# Patient Record
Sex: FEMALE | Race: AMERICAN INDIAN OR ALASKA NATIVE | NOT HISPANIC OR LATINO | URBAN - METROPOLITAN AREA
[De-identification: names, ages, dates, MRNs, and addresses within clinical notes are randomized per-mention and may not be internally consistent; named-entity substitution may affect disease eponyms.]

---

## 2021-08-06 ENCOUNTER — HISTORICAL (OUTPATIENT)
Dept: ADMINISTRATIVE | Facility: HOSPITAL | Age: 31
End: 2021-08-06

## 2021-08-06 LAB
GLUCOSE 1H P 100 G GLC PO SERPL-MCNC: 161 MG/DL (ref 100–180)
GLUCOSE 2H P 100 G GLC PO SERPL-MCNC: 138 MG/DL (ref 70–140)
GLUCOSE 3H P 100 G GLC PO SERPL-MCNC: 94 MG/DL (ref 70–115)
GLUCOSE BS SERPL-MCNC: 85 MG/DL (ref 70–115)

## 2021-11-12 ENCOUNTER — HISTORICAL (OUTPATIENT)
Dept: ADMINISTRATIVE | Facility: HOSPITAL | Age: 31
End: 2021-11-12

## 2021-11-12 LAB — SARS-COV-2 RNA RESP QL NAA+PROBE: NOT DETECTED

## 2023-01-27 LAB
CHOLEST SERPL-MSCNC: 130 MG/DL (ref 0–200)
HDLC SERPL-MCNC: 54 MG/DL (ref 35–70)
LDLC SERPL CALC-MCNC: 58 MG/DL (ref 0–160)
TRIGL SERPL-MCNC: 97 MG/DL (ref 40–160)

## 2023-03-22 ENCOUNTER — OFFICE VISIT (OUTPATIENT)
Dept: FAMILY MEDICINE | Facility: CLINIC | Age: 33
End: 2023-03-22
Payer: COMMERCIAL

## 2023-03-22 VITALS
SYSTOLIC BLOOD PRESSURE: 98 MMHG | BODY MASS INDEX: 22.26 KG/M2 | TEMPERATURE: 98 F | HEIGHT: 63 IN | WEIGHT: 125.63 LBS | HEART RATE: 96 BPM | DIASTOLIC BLOOD PRESSURE: 66 MMHG | RESPIRATION RATE: 20 BRPM | OXYGEN SATURATION: 100 %

## 2023-03-22 DIAGNOSIS — D50.0 IRON DEFICIENCY ANEMIA DUE TO CHRONIC BLOOD LOSS: Primary | ICD-10-CM

## 2023-03-22 DIAGNOSIS — Z00.00 WELLNESS EXAMINATION: ICD-10-CM

## 2023-03-22 PROCEDURE — 3074F SYST BP LT 130 MM HG: CPT | Mod: CPTII,,, | Performed by: FAMILY MEDICINE

## 2023-03-22 PROCEDURE — 99213 OFFICE O/P EST LOW 20 MIN: CPT | Mod: ,,, | Performed by: FAMILY MEDICINE

## 2023-03-22 PROCEDURE — 1159F PR MEDICATION LIST DOCUMENTED IN MEDICAL RECORD: ICD-10-PCS | Mod: CPTII,,, | Performed by: FAMILY MEDICINE

## 2023-03-22 PROCEDURE — 3078F PR MOST RECENT DIASTOLIC BLOOD PRESSURE < 80 MM HG: ICD-10-PCS | Mod: CPTII,,, | Performed by: FAMILY MEDICINE

## 2023-03-22 PROCEDURE — 1160F PR REVIEW ALL MEDS BY PRESCRIBER/CLIN PHARMACIST DOCUMENTED: ICD-10-PCS | Mod: CPTII,,, | Performed by: FAMILY MEDICINE

## 2023-03-22 PROCEDURE — 1160F RVW MEDS BY RX/DR IN RCRD: CPT | Mod: CPTII,,, | Performed by: FAMILY MEDICINE

## 2023-03-22 PROCEDURE — 3074F PR MOST RECENT SYSTOLIC BLOOD PRESSURE < 130 MM HG: ICD-10-PCS | Mod: CPTII,,, | Performed by: FAMILY MEDICINE

## 2023-03-22 PROCEDURE — 3008F PR BODY MASS INDEX (BMI) DOCUMENTED: ICD-10-PCS | Mod: CPTII,,, | Performed by: FAMILY MEDICINE

## 2023-03-22 PROCEDURE — 1159F MED LIST DOCD IN RCRD: CPT | Mod: CPTII,,, | Performed by: FAMILY MEDICINE

## 2023-03-22 PROCEDURE — 3008F BODY MASS INDEX DOCD: CPT | Mod: CPTII,,, | Performed by: FAMILY MEDICINE

## 2023-03-22 PROCEDURE — 3078F DIAST BP <80 MM HG: CPT | Mod: CPTII,,, | Performed by: FAMILY MEDICINE

## 2023-03-22 PROCEDURE — 99213 PR OFFICE/OUTPT VISIT, EST, LEVL III, 20-29 MIN: ICD-10-PCS | Mod: ,,, | Performed by: FAMILY MEDICINE

## 2023-03-22 RX ORDER — MULTIVITAMIN
1 TABLET ORAL DAILY
COMMUNITY

## 2023-03-22 RX ORDER — SWAB
1 SWAB, NON-MEDICATED MISCELLANEOUS DAILY
Qty: 90 TABLET | Refills: 3 | Status: SHIPPED | OUTPATIENT
Start: 2023-03-22 | End: 2023-10-26 | Stop reason: SDUPTHER

## 2023-03-22 NOTE — PROGRESS NOTES
"   Patient ID: [id number]     Chief Complaint: Follow-up (Discuss Labs)      HPI:     Bel Zurita is a 32 y.o. female here today for a follow up.   1) Has made modifications to diet and exercise significant improvement in joint pain.      History reviewed. No pertinent past medical history.     Past Surgical History:   Procedure Laterality Date     SECTION          TUBAL LIGATION          Social History     Tobacco Use    Smoking status: Never    Smokeless tobacco: Never   Substance Use Topics    Alcohol use: Never    Drug use: Never        Social History     Socioeconomic History    Marital status:      Spouse name: Shanell    Number of children: 2        Current Outpatient Medications   Medication Instructions    multivitamin (ONE DAILY MULTIVITAMIN) per tablet 1 tablet, Oral, Daily       Review of patient's allergies indicates:  No Known Allergies     Patient Care Team:  Aria Maxwell MD as PCP - General (Family Medicine)       Subjective:     Review of Systems    12 point review of systems conducted, negative except as stated in the history of present illness. See HPI for details.      Objective:     Visit Vitals  BP 98/66 (BP Location: Right arm, Patient Position: Sitting, BP Method: Medium (Automatic))   Pulse 96   Temp 98 °F (36.7 °C) (Oral)   Resp 20   Ht 5' 3" (1.6 m)   Wt 57 kg (125 lb 9.6 oz)   LMP 2023 (Exact Date)   SpO2 100%   BMI 22.25 kg/m²       Physical Exam    General: Alert and oriented, No acute distress.  Head: Normocephalic, Atraumatic.  Eye: Pupils are equal, round and reactive to light, Extraocular movements are intact, Sclera non-icteric.  Ears/Nose/Throat: Normal, Mucosa moist,Clear.  Neck/Thyroid: Supple, Non-tender, No carotid bruit, No lymphadenopathy, No JVD, Full range of motion.  Respiratory: Clear to auscultation bilaterally; No wheezes, rales or rhonchi,Non-labored respirations, Symmetrical chest wall expansion.  Cardiovascular: " Regular rate and rhythm, S1/S2 normal, No murmurs, rubs or gallops.  Gastrointestinal: Soft, Non-tender, Non-distended, Normal bowel sounds, No palpable organomegaly.  Musculoskeletal: Normal range of motion.  Integumentary: Warm, Dry, Intact, No suspicious lesions or rashes.  Extremities: No clubbing, cyanosis or edema  Neurologic: No focal deficits  Psychiatric: Normal interaction    Assessment:       ICD-10-CM ICD-9-CM   1. Iron deficiency anemia due to chronic blood loss  D50.0 280.0        Plan:     1. Iron deficiency anemia due to chronic blood loss  Pathophysiology/Treatment/ Dangers Discussed.  Rx sent for prenatal vitamins.  Patient to address heavy menstrual cycle with gynecologist.  Heavy menstrual cycles started after tubal ligation treatment options discussed at length with patient.           No follow-ups on file. In addition to their scheduled follow up, the patient has also been instructed to follow up on as needed basis.     No future appointments.     Aria Maxwell MD

## 2023-10-26 ENCOUNTER — OFFICE VISIT (OUTPATIENT)
Dept: FAMILY MEDICINE | Facility: CLINIC | Age: 33
End: 2023-10-26
Payer: COMMERCIAL

## 2023-10-26 VITALS
OXYGEN SATURATION: 100 % | SYSTOLIC BLOOD PRESSURE: 96 MMHG | WEIGHT: 124 LBS | DIASTOLIC BLOOD PRESSURE: 59 MMHG | HEART RATE: 83 BPM | BODY MASS INDEX: 21.97 KG/M2 | TEMPERATURE: 99 F | HEIGHT: 63 IN | RESPIRATION RATE: 20 BRPM

## 2023-10-26 DIAGNOSIS — L03.011 ACUTE PARONYCHIA OF FINGER OF RIGHT HAND: Primary | ICD-10-CM

## 2023-10-26 DIAGNOSIS — D50.0 IRON DEFICIENCY ANEMIA DUE TO CHRONIC BLOOD LOSS: ICD-10-CM

## 2023-10-26 DIAGNOSIS — M54.2 NECK PAIN: ICD-10-CM

## 2023-10-26 PROCEDURE — 3074F SYST BP LT 130 MM HG: CPT | Mod: CPTII,,, | Performed by: FAMILY MEDICINE

## 2023-10-26 PROCEDURE — 1160F RVW MEDS BY RX/DR IN RCRD: CPT | Mod: CPTII,,, | Performed by: FAMILY MEDICINE

## 2023-10-26 PROCEDURE — 99214 PR OFFICE/OUTPT VISIT, EST, LEVL IV, 30-39 MIN: ICD-10-PCS | Mod: ,,, | Performed by: FAMILY MEDICINE

## 2023-10-26 PROCEDURE — 99214 OFFICE O/P EST MOD 30 MIN: CPT | Mod: ,,, | Performed by: FAMILY MEDICINE

## 2023-10-26 PROCEDURE — 1160F PR REVIEW ALL MEDS BY PRESCRIBER/CLIN PHARMACIST DOCUMENTED: ICD-10-PCS | Mod: CPTII,,, | Performed by: FAMILY MEDICINE

## 2023-10-26 PROCEDURE — 3008F PR BODY MASS INDEX (BMI) DOCUMENTED: ICD-10-PCS | Mod: CPTII,,, | Performed by: FAMILY MEDICINE

## 2023-10-26 PROCEDURE — 3078F PR MOST RECENT DIASTOLIC BLOOD PRESSURE < 80 MM HG: ICD-10-PCS | Mod: CPTII,,, | Performed by: FAMILY MEDICINE

## 2023-10-26 PROCEDURE — 3008F BODY MASS INDEX DOCD: CPT | Mod: CPTII,,, | Performed by: FAMILY MEDICINE

## 2023-10-26 PROCEDURE — 3074F PR MOST RECENT SYSTOLIC BLOOD PRESSURE < 130 MM HG: ICD-10-PCS | Mod: CPTII,,, | Performed by: FAMILY MEDICINE

## 2023-10-26 PROCEDURE — 1159F PR MEDICATION LIST DOCUMENTED IN MEDICAL RECORD: ICD-10-PCS | Mod: CPTII,,, | Performed by: FAMILY MEDICINE

## 2023-10-26 PROCEDURE — 1159F MED LIST DOCD IN RCRD: CPT | Mod: CPTII,,, | Performed by: FAMILY MEDICINE

## 2023-10-26 PROCEDURE — 3078F DIAST BP <80 MM HG: CPT | Mod: CPTII,,, | Performed by: FAMILY MEDICINE

## 2023-10-26 RX ORDER — SWAB
1 SWAB, NON-MEDICATED MISCELLANEOUS DAILY
Qty: 90 TABLET | Refills: 3 | Status: SHIPPED | OUTPATIENT
Start: 2023-10-26 | End: 2024-10-25

## 2023-10-26 RX ORDER — CEPHALEXIN 500 MG/1
500 CAPSULE ORAL EVERY 12 HOURS
Qty: 14 CAPSULE | Refills: 0 | Status: SHIPPED | OUTPATIENT
Start: 2023-10-26 | End: 2023-11-02

## 2023-10-26 RX ORDER — DICLOFENAC SODIUM 75 MG/1
75 TABLET, DELAYED RELEASE ORAL 2 TIMES DAILY
Qty: 60 TABLET | Refills: 1 | Status: SHIPPED | OUTPATIENT
Start: 2023-10-26 | End: 2023-11-27

## 2023-10-26 NOTE — PROGRESS NOTES
"   Patient ID: [id number]     Chief Complaint: Finger Pain      HPI:     Bel Zurita is a 32 y.o. female here today for acute visit.  1) Has been having problems with pain of her 2nd finger in her right hand-swelling at the bottom-painful to touch.  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  No associated systemic symptoms-such as fever, chills, nausea, vomiting or shortness a breath.  2) Patient has also been having some problems with neck pain-usually related to the way she sleeping waking up with the pain-no associated weakness of the hands or fingers.  Has not taken any medication for the pain.      History reviewed. No pertinent past medical history.     Past Surgical History:   Procedure Laterality Date     SECTION          TUBAL LIGATION          Social History     Tobacco Use    Smoking status: Never    Smokeless tobacco: Never   Substance Use Topics    Alcohol use: Never    Drug use: Never        Social History     Socioeconomic History    Marital status:      Spouse name: Shanell    Number of children: 2        Current Outpatient Medications   Medication Instructions    cephALEXin (KEFLEX) 500 mg, Oral, Every 12 hours    diclofenac (VOLTAREN) 75 mg, Oral, 2 times daily    multivitamin (ONE DAILY MULTIVITAMIN) per tablet 1 tablet, Oral, Daily    prenatal vit-iron fum-folic ac 28 mg iron- 800 mcg Tab 1 tablet, Oral, Daily       Review of patient's allergies indicates:  No Known Allergies     Patient Care Team:  Aria Maxwell MD as PCP - General (Family Medicine)       Subjective:     Review of Systems    12 point review of systems conducted, negative except as stated in the history of present illness. See HPI for details.      Objective:     Visit Vitals  BP (!) 96/59 (BP Location: Right arm, Patient Position: Sitting, BP Method: Medium (Automatic))   Pulse 83   Temp 98.5 °F (36.9 °C) (Oral)   Resp 20   Ht 5' 3" (1.6 m)   Wt 56.2 kg " (124 lb)   LMP 09/27/2023 (Exact Date)   SpO2 100%   BMI 21.97 kg/m²       Physical Exam    General: Alert and oriented, No acute distress.  Head: Normocephalic, Atraumatic.  Eye: Pupils are equal, round and reactive to light, Extraocular movements are intact, Sclera non-icteric.  Ears/Nose/Throat: Normal, Mucosa moist,Clear.  Neck/Thyroid: Supple,Full range of motion-tightness of the cervical spine.  Respiratory: Clear to auscultation bilaterally  Cardiovascular: Regular rate and rhythm  Gastrointestinal: Soft, Non-tender, Non-distended, Normal bowel sounds  Musculoskeletal: Normal range of motion.  Integumentary: Warm, Dry, right hand-2nd finger-swelling and redness of the nail bed  Extremities: No clubbing, cyanosis or edema  Neurologic: No focal deficits, Cranial Nerves II-XII are grossly intact, Motor strength normal upper and lower extremities, Sensory exam intact.  Psychiatric: Normal interaction      Assessment:       ICD-10-CM ICD-9-CM   1. Acute paronychia of finger of right hand  L03.011 681.02   2. Neck pain  M54.2 723.1   3. Iron deficiency anemia due to chronic blood loss  D50.0 280.0        Plan:     1. Acute paronychia of finger of right hand  Pathophysiology/Treatment/ Dangers Discussed.  Rx antibiotics.  Modifications to activity discussed.  - cephALEXin (KEFLEX) 500 MG capsule; Take 1 capsule (500 mg total) by mouth every 12 (twelve) hours. for 7 days  Dispense: 14 capsule; Refill: 0    2. Neck pain  Pathophysiology/Treatment/ Dangers Discussed.  Rx anti-inflammatory medication.  Risk associated with medication discussed.  - diclofenac (VOLTAREN) 75 MG EC tablet; Take 1 tablet (75 mg total) by mouth 2 (two) times daily.  Dispense: 60 tablet; Refill: 1    3. Iron deficiency anemia due to chronic blood loss  Pathophysiology/Treatment/ Dangers Discussed.  Rx prenatal vitamins.  - prenatal vit-iron fum-folic ac 28 mg iron- 800 mcg Tab; Take 1 tablet by mouth once daily.  Dispense: 90 tablet; Refill:  3    Follow up if symptoms worsen or fail to improve. In addition to their scheduled follow up, the patient has also been instructed to follow up on as needed basis.     Future Appointments   Date Time Provider Department Center   3/26/2024 11:30 AM Aria Maxwell MD [facility] [place] Indira Gautam, MD

## 2023-10-31 ENCOUNTER — PATIENT OUTREACH (OUTPATIENT)
Dept: ADMINISTRATIVE | Facility: HOSPITAL | Age: 33
End: 2023-10-31
Payer: COMMERCIAL

## 2023-10-31 NOTE — LETTER
"  This communication is flagged as high priority.        AUTHORIZATION FOR RELEASE OF   CONFIDENTIAL INFORMATION    Dear Staff,    We are seeing Bel Zurita, date of birth 1990, in the clinic at [facility]. Aria Maxwell MD is the patient's PCP. Bel Zurita has an outstanding lab/procedure at the time we reviewed her chart. In order to help keep her health information updated, she has authorized us to request the following medical record(s):        (  )  MAMMOGRAM                                      (  )  COLONOSCOPY      (xx  )  PAP SMEAR                                          (  )  OUTSIDE LAB RESULTS     (  )  DEXA SCAN                                          (  )  EYE EXAM            (  )  FOOT EXAM                                          (  )  ENTIRE RECORD     (  )  OUTSIDE IMMUNIZATIONS                 (  )  _______________         Please fax records to [facility], Gautam, Indira, MD,  [phone]  Attn: Manju      If you have any questions, please contact Samanta Obrien" Alyssa ,Care Coordinator @ [phone]     Patient Name: Bel Zurita  : 1990  Patient Phone #: [phone]     "

## 2023-10-31 NOTE — PROGRESS NOTES
The following record(s)  below were uploaded for Health Maintenance .    LIPID PANEL [id number]   Detail Level: Detailed

## 2023-11-25 DIAGNOSIS — M54.2 NECK PAIN: ICD-10-CM

## 2023-11-27 RX ORDER — DICLOFENAC SODIUM 75 MG/1
75 TABLET, DELAYED RELEASE ORAL 2 TIMES DAILY
Qty: 60 TABLET | Refills: 1 | Status: SHIPPED | OUTPATIENT
Start: 2023-11-27

## 2023-12-18 ENCOUNTER — PATIENT OUTREACH (OUTPATIENT)
Dept: ADMINISTRATIVE | Facility: HOSPITAL | Age: 33
End: 2023-12-18
Payer: COMMERCIAL

## 2023-12-18 NOTE — LETTER
"  This communication is flagged as high priority.        AUTHORIZATION FOR RELEASE OF   CONFIDENTIAL INFORMATION    Dear Staff,    We are seeing Bel Zurita, date of birth 1990, in the clinic at [facility]. Aria Maxwell MD is the patient's PCP. Bel Zurita has an outstanding lab/procedure at the time we reviewed her chart. In order to help keep her health information updated, she has authorized us to request the following medical record(s):                    ( xx )  PAP SMEAR   none found in Care Everywhere          Please fax records to [facility], Gautam, Indira, MD,  [phone]  Attn: Manju      If you have any questions, please contact Samanta Obrien" AlyssaCare Coordinator @ [phone]    Patient Name: Bel Zurita  : 1990  Patient Phone #: [phone]     "